# Patient Record
Sex: FEMALE | Race: WHITE | Employment: UNEMPLOYED | URBAN - METROPOLITAN AREA
[De-identification: names, ages, dates, MRNs, and addresses within clinical notes are randomized per-mention and may not be internally consistent; named-entity substitution may affect disease eponyms.]

---

## 2018-10-01 ENCOUNTER — HOSPITAL ENCOUNTER (EMERGENCY)
Age: 83
Discharge: HOME | End: 2018-10-01
Attending: EMERGENCY MEDICINE
Payer: MEDICARE

## 2018-10-01 ENCOUNTER — APPOINTMENT (OUTPATIENT)
Dept: CT IMAGING | Age: 83
End: 2018-10-01
Payer: MEDICARE

## 2018-10-01 VITALS
WEIGHT: 140 LBS | HEIGHT: 66 IN | SYSTOLIC BLOOD PRESSURE: 121 MMHG | BODY MASS INDEX: 22.5 KG/M2 | OXYGEN SATURATION: 98 % | RESPIRATION RATE: 16 BRPM | DIASTOLIC BLOOD PRESSURE: 54 MMHG | HEART RATE: 62 BPM

## 2018-10-01 VITALS
HEART RATE: 76 BPM | OXYGEN SATURATION: 97 % | HEIGHT: 66 IN | SYSTOLIC BLOOD PRESSURE: 124 MMHG | BODY MASS INDEX: 22.5 KG/M2 | WEIGHT: 140 LBS | RESPIRATION RATE: 16 BRPM | TEMPERATURE: 98.8 F | DIASTOLIC BLOOD PRESSURE: 99 MMHG

## 2018-10-01 DIAGNOSIS — S01.01XA LACERATION OF SCALP, INITIAL ENCOUNTER: Primary | ICD-10-CM
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
## 2018-10-01 DIAGNOSIS — S09.90XA CLOSED HEAD INJURY, INITIAL ENCOUNTER: Primary | ICD-10-CM

## 2018-10-01 DIAGNOSIS — S01.01XA LACERATION OF SCALP, INITIAL ENCOUNTER: ICD-10-CM

## 2018-10-01 DIAGNOSIS — S09.90XA INJURY OF HEAD, INITIAL ENCOUNTER: ICD-10-CM

## 2018-10-01 LAB
A/G RATIO: 1.3 (ref 1.1–2.2)
ALBUMIN SERPL-MCNC: 3.4 G/DL (ref 3.4–5)
ALP BLD-CCNC: 69 U/L (ref 40–129)
ALT SERPL-CCNC: 9 U/L (ref 10–40)
ANION GAP SERPL CALCULATED.3IONS-SCNC: 12 MMOL/L (ref 3–16)
AST SERPL-CCNC: 19 U/L (ref 15–37)
BASOPHILS ABSOLUTE: 0 K/UL (ref 0–0.2)
BASOPHILS RELATIVE PERCENT: 0.4 %
BILIRUB SERPL-MCNC: 0.4 MG/DL (ref 0–1)
BUN BLDV-MCNC: 19 MG/DL (ref 7–20)
CALCIUM SERPL-MCNC: 8.9 MG/DL (ref 8.3–10.6)
CHLORIDE BLD-SCNC: 104 MMOL/L (ref 99–110)
CO2: 25 MMOL/L (ref 21–32)
CREAT SERPL-MCNC: 0.8 MG/DL (ref 0.6–1.2)
EOSINOPHILS ABSOLUTE: 0.1 K/UL (ref 0–0.6)
EOSINOPHILS RELATIVE PERCENT: 1.7 %
GFR AFRICAN AMERICAN: >60
GFR NON-AFRICAN AMERICAN: >60
GLOBULIN: 2.7 G/DL
GLUCOSE BLD-MCNC: 105 MG/DL (ref 70–99)
HCT VFR BLD CALC: 41.3 % (ref 36–48)
HEMOGLOBIN: 13.6 G/DL (ref 12–16)
LYMPHOCYTES ABSOLUTE: 2.2 K/UL (ref 1–5.1)
LYMPHOCYTES RELATIVE PERCENT: 29.5 %
MCH RBC QN AUTO: 31 PG (ref 26–34)
MCHC RBC AUTO-ENTMCNC: 33 G/DL (ref 31–36)
MCV RBC AUTO: 94.2 FL (ref 80–100)
MONOCYTES ABSOLUTE: 0.7 K/UL (ref 0–1.3)
MONOCYTES RELATIVE PERCENT: 9 %
NEUTROPHILS ABSOLUTE: 4.4 K/UL (ref 1.7–7.7)
NEUTROPHILS RELATIVE PERCENT: 59.4 %
PDW BLD-RTO: 13.8 % (ref 12.4–15.4)
PLATELET # BLD: 261 K/UL (ref 135–450)
PMV BLD AUTO: 7.7 FL (ref 5–10.5)
POTASSIUM SERPL-SCNC: 4.3 MMOL/L (ref 3.5–5.1)
RBC # BLD: 4.39 M/UL (ref 4–5.2)
SODIUM BLD-SCNC: 141 MMOL/L (ref 136–145)
TOTAL PROTEIN: 6.1 G/DL (ref 6.4–8.2)
WBC # BLD: 7.4 K/UL (ref 4–11)

## 2018-10-01 PROCEDURE — 70450 CT HEAD/BRAIN W/O DYE: CPT

## 2018-10-01 PROCEDURE — 4500000024 HC ED LEVEL 4 PROCEDURE

## 2018-10-01 PROCEDURE — 4500000025 HC ED LEVEL 5 PROCEDURE

## 2018-10-01 PROCEDURE — 72125 CT NECK SPINE W/O DYE: CPT

## 2018-10-01 PROCEDURE — 85025 COMPLETE CBC W/AUTO DIFF WBC: CPT

## 2018-10-01 PROCEDURE — 80053 COMPREHEN METABOLIC PANEL: CPT

## 2018-10-01 PROCEDURE — 99285 EMERGENCY DEPT VISIT HI MDM: CPT

## 2018-10-01 PROCEDURE — 99284 EMERGENCY DEPT VISIT MOD MDM: CPT

## 2018-10-01 PROCEDURE — 6360000002 HC RX W HCPCS: Performed by: PHYSICIAN ASSISTANT

## 2018-10-01 RX ORDER — ACETAMINOPHEN 500 MG
1000 TABLET ORAL ONCE
Status: DISCONTINUED | OUTPATIENT
Start: 2018-10-01 | End: 2018-10-01 | Stop reason: HOSPADM

## 2018-10-01 RX ORDER — DONEPEZIL HYDROCHLORIDE 10 MG/1
10 TABLET, FILM COATED ORAL NIGHTLY
COMMUNITY

## 2018-10-01 RX ORDER — LANOLIN ALCOHOL/MO/W.PET/CERES
3 CREAM (GRAM) TOPICAL DAILY
COMMUNITY

## 2018-10-01 RX ORDER — LORAZEPAM 2 MG/ML
1 INJECTION INTRAMUSCULAR ONCE
Status: COMPLETED | OUTPATIENT
Start: 2018-10-01 | End: 2018-10-01

## 2018-10-01 RX ORDER — AMOXICILLIN 250 MG
1 CAPSULE ORAL DAILY
COMMUNITY

## 2018-10-01 RX ADMIN — LORAZEPAM 1 MG: 2 INJECTION, SOLUTION INTRAMUSCULAR; INTRAVENOUS at 10:30

## 2018-10-01 ASSESSMENT — PAIN DESCRIPTION - LOCATION: LOCATION: HEAD

## 2018-10-01 ASSESSMENT — ENCOUNTER SYMPTOMS
VOMITING: 0
ABDOMINAL PAIN: 0
SHORTNESS OF BREATH: 0
BACK PAIN: 0
NAUSEA: 0

## 2018-10-01 ASSESSMENT — PAIN SCALES - GENERAL: PAINLEVEL_OUTOF10: 10

## 2018-10-01 NOTE — ED PROVIDER NOTES
Attending Supervisory Note/Shared Visit   I have personally performed a face to face diagnostic evaluation on this patient. I have reviewed the mid-levels findings and agree. History and Exam by me shows: This is a 80-year-old female brought to emergency department for evaluation status post fall. Patient does have a history of dementia. Reportedly was walking the hallway and mechanical slip and fall striking the back of her head. No reported loss of consciousness. No reported head injury in the past 2 weeks. Patient himself has no complaints at this time. Physical exam upon arrival patient's afebrile vital signs noted above in general well-appearing well-nourished elderly female in no acute distress chest regular rhythm no anterior chest wall tenderness abdomen soft nontender nondistended lungs clear to auscultation bilaterally no tenderness to palpation over bony prominences of clavicle shoulders humerus elbow is radius ulnar or scaphoid bilaterally. Patient's pelvis is stable no tenderness over femurs knees tib-fib or ankle. Patient does have a 5 cm laceration on the hospital for head no arterial bleeding noted no evidence of depressed skull fracture palpated    Medical decision making  80-year-old female brought to emergency department for evaluation of fall with resultant scalp laceration. Based on patient's history and physical would be concerned about possible intracranial injury. No other obvious injuries are noted on examination patient himself has no current complaints at this time    Clinical impression 1 closed head injury 2.   Scalp laceration     Joseph Bass MD  10/01/18 1111
in conjunction with attending physician Dr. Nilay Galvez. The patient and / or the family were informed of the results of any tests, a time was given to answer questions, a plan was proposed and they agreed with plan. CLINICAL IMPRESSION:  1. Laceration of scalp, initial encounter    2. Injury of head, initial encounter    3.  Fall, initial encounter        DISPOSITION Discharge - Pending Orders Complete 10/01/2018 11:05:07 AM      PATIENT REFERRED TO:  Robinson Altamirano III, MD  [address] [place] [address]  [phone]    Schedule an appointment as soon as possible for a visit   For suture removal 5 days    [facility] Emergency Department  [address] [place] [address]  [phone]    As needed      DISCHARGE MEDICATIONS:  New Prescriptions    No medications on file       DISCONTINUED MEDICATIONS:  Discontinued Medications    No medications on file              (Please note the MDM and HPI sections of this note were completed with a voice recognition program.  Efforts were made to edit the dictations but occasionally words are mis-transcribed.)    Electronically signed, Bridger Trotter PA-C,          Bridger rTotter PA-C  10/01/18 1130

## 2018-10-01 NOTE — ED PROVIDER NOTES
CHIEF COMPLAINT  Fall (pt fell and hit her head. now has small laceration to the back of head. pt just here earlier today. )      HISTORY OF PRESENT ILLNESS  Brenda Galarza is a 80 y.o. female presents to the ED with presents emergency Department chief complaint of the fall. The patient is alert to person only. She is apparently at her baseline per nursing report. The entire HPI as obtained from nursing report and EMS report. As well as the medical record. The patient was recently seen with similar complaints. She had a fall that was seen on security tape apparently tripped  Apparently the patient was back at the nursing facility again and seemed with a another fall today. .  She complains of a minor headache. States is in the top of her head. Is unsure if she lost consciousness he is unsure of any of her medications. .  No other complaints, modifying factors or associated symptoms. I have reviewed the following from the nursing documentation. Past Medical History:   Diagnosis Date    Bronchial asthma     GERD (gastroesophageal reflux disease)     Osteopenia      Past Surgical History:   Procedure Laterality Date    CARPAL TUNNEL RELEASE      Left    CATARACT REMOVAL      Bilateral    COLONOSCOPY  2002    RETINAL DETACHMENT SURGERY      Left    TOTAL HIP ARTHROPLASTY  5/2008    Left    TOTAL KNEE ARTHROPLASTY      Left 9/2007, Right 5/2007    UPPER GASTROINTESTINAL ENDOSCOPY  1990     Family History   Problem Relation Age of Onset    Bleeding Prob Mother         cerebral hemorrhage    Mental Illness Father      Social History     Social History    Marital status:      Spouse name: N/A    Number of children: N/A    Years of education: N/A     Occupational History    Not on file.      Social History Main Topics    Smoking status: Never Smoker    Smokeless tobacco: Never Used    Alcohol use Yes      Comment: occasionally    Drug use: No    Sexual activity: Not on file     Other organomegaly. Non-tender  EXTREMITIES: No peripheral edema. Moves all extremities equally. No gross deformities of the upper or lower extremities. SKIN: Warm and dry. No acute rashes. NEUROLOGICAL: Alert and oriented. Cranial nerves II-12 are  intact, no focal neurologic deficits . PSYCHIATRIC: Normal mood and affect. Physical Exam    LABS  I have reviewed all labs for this visit. No results found for this visit on 10/01/18. EKG  [unfilled]    RADIOLOGY  Ct Head Wo Contrast    Result Date: 10/1/2018  EXAMINATION: CT OF THE HEAD WITHOUT CONTRAST  10/1/2018 4:20 pm TECHNIQUE: CT of the head was performed without the administration of intravenous contrast. Dose modulation, iterative reconstruction, and/or weight based adjustment of the mA/kV was utilized to reduce the radiation dose to as low as reasonably achievable. COMPARISON: 10/01/2018 HISTORY: ORDERING SYSTEM PROVIDED HISTORY: fall TECHNOLOGIST PROVIDED HISTORY: Has a \"code stroke\" or \"stroke alert\" been called? ->No Ordering Physician Provided Reason for Exam: fall Acuity: Acute Type of Exam: Initial Headache. FINDINGS: BRAIN/VENTRICLES: There is no acute intracranial hemorrhage, mass effect or midline shift. No abnormal extra-axial fluid collection. The gray-white differentiation is maintained without evidence of an acute infarct. There is prominence of the ventricles and sulci due to global parenchymal volume loss. There are nonspecific areas of hypoattenuation within the periventricular and subcortical white matter, which likely represent chronic microvascular ischemic change. ORBITS: The visualized portion of the orbits demonstrate no acute abnormality. SINUSES: The visualized paranasal sinuses and mastoid air cells demonstrate no acute abnormality. SOFT TISSUES/SKULL: No acute abnormality of the visualized skull or soft tissues. No acute intracranial abnormality.      Ct Head Wo Contrast    Result Date: 10/1/2018  EXAMINATION: CT OF THE HEAD WITHOUT CONTRAST  10/1/2018 9:32 am TECHNIQUE: CT of the head was performed without the administration of intravenous contrast. Dose modulation, iterative reconstruction, and/or weight based adjustment of the mA/kV was utilized to reduce the radiation dose to as low as reasonably achievable. COMPARISON: None. HISTORY: ORDERING SYSTEM PROVIDED HISTORY: head injury TECHNOLOGIST PROVIDED HISTORY: If patient is on cardiac monitor and/or pulse ox, they may be taken off cardiac monitor and pulse ox, left on O2 if currently on. All monitors reattached when patient returns to room. Has a \"code stroke\" or \"stroke alert\" been called? ->No Ordering Physician Provided Reason for Exam: fall Acuity: Acute Type of Exam: Initial Headache. FINDINGS: BRAIN/VENTRICLES: There is no acute intracranial hemorrhage, mass effect or midline shift. No abnormal extra-axial fluid collection. The gray-white differentiation is maintained without evidence of an acute infarct. There is prominence of the ventricles and sulci due to global parenchymal volume loss. There are nonspecific areas of hypoattenuation within the periventricular and subcortical white matter, which likely represent chronic microvascular ischemic change. ORBITS: The visualized portion of the orbits demonstrate no acute abnormality. SINUSES: The visualized paranasal sinuses and mastoid air cells demonstrate no acute abnormality. SOFT TISSUES/SKULL: No acute abnormality of the visualized skull or soft tissues. No acute intracranial abnormality. Ct Cervical Spine Wo Contrast    Result Date: 10/1/2018  EXAMINATION: CT OF THE CERVICAL SPINE WITHOUT CONTRAST 10/1/2018 9:32 am TECHNIQUE: CT of the cervical spine was performed without the administration of intravenous contrast. Multiplanar reformatted images are provided for review.  Dose modulation, iterative reconstruction, and/or weight based adjustment of the mA/kV was utilized to reduce the radiation dose to as low as

## 2018-10-01 NOTE — ED NOTES
Pt ambulated well with x1 assist. Pt denies any pain or difficulty with ambulation.       Valentino Stark, RN  10/01/18 6664

## 2018-10-01 NOTE — ED NOTES
Bed: 14  Expected date:   Expected time:   Means of arrival: [facility]  Comments:  Taya Rome RN  10/01/18 1202

## 2020-08-01 ENCOUNTER — APPOINTMENT (OUTPATIENT)
Dept: CT IMAGING | Age: 85
DRG: 533 | End: 2020-08-01
Payer: MEDICARE

## 2020-08-01 ENCOUNTER — HOSPITAL ENCOUNTER (INPATIENT)
Age: 85
LOS: 2 days | Discharge: OTHER INSTITUTION | DRG: 533 | End: 2020-08-03
Attending: EMERGENCY MEDICINE | Admitting: INTERNAL MEDICINE
Payer: MEDICARE

## 2020-08-01 ENCOUNTER — APPOINTMENT (OUTPATIENT)
Dept: GENERAL RADIOLOGY | Age: 85
DRG: 533 | End: 2020-08-01
Payer: MEDICARE

## 2020-08-01 PROBLEM — T14.8XXA CLOSED FRACTURE OF BONE: Status: ACTIVE | Noted: 2020-08-01

## 2020-08-01 LAB
A/G RATIO: 1.2 (ref 1.1–2.2)
ABO/RH: NORMAL
ALBUMIN SERPL-MCNC: 3.5 G/DL (ref 3.4–5)
ALP BLD-CCNC: 82 U/L (ref 40–129)
ALT SERPL-CCNC: 21 U/L (ref 10–40)
ANION GAP SERPL CALCULATED.3IONS-SCNC: 10 MMOL/L (ref 3–16)
ANTIBODY SCREEN: NORMAL
AST SERPL-CCNC: 34 U/L (ref 15–37)
BACTERIA: ABNORMAL /HPF
BASOPHILS ABSOLUTE: 0 K/UL (ref 0–0.2)
BASOPHILS RELATIVE PERCENT: 0.3 %
BILIRUB SERPL-MCNC: 0.3 MG/DL (ref 0–1)
BILIRUBIN URINE: NEGATIVE
BLOOD, URINE: ABNORMAL
BUN BLDV-MCNC: 25 MG/DL (ref 7–20)
CALCIUM SERPL-MCNC: 9.3 MG/DL (ref 8.3–10.6)
CHLORIDE BLD-SCNC: 108 MMOL/L (ref 99–110)
CLARITY: ABNORMAL
CO2: 26 MMOL/L (ref 21–32)
COLOR: YELLOW
COMMENT UA: ABNORMAL
CREAT SERPL-MCNC: 0.7 MG/DL (ref 0.6–1.2)
DAT IGG CAPTURE: NORMAL
EOSINOPHILS ABSOLUTE: 0 K/UL (ref 0–0.6)
EOSINOPHILS RELATIVE PERCENT: 0 %
EPITHELIAL CELLS, UA: 3 /HPF (ref 0–5)
GFR AFRICAN AMERICAN: >60
GFR NON-AFRICAN AMERICAN: >60
GLOBULIN: 3 G/DL
GLUCOSE BLD-MCNC: 132 MG/DL (ref 70–99)
GLUCOSE URINE: NEGATIVE MG/DL
HCT VFR BLD CALC: 26.9 % (ref 36–48)
HEMOGLOBIN: 8.3 G/DL (ref 12–16)
HYALINE CASTS: 11 /LPF (ref 0–8)
INR BLD: 1.53 (ref 0.86–1.14)
KETONES, URINE: ABNORMAL MG/DL
LACTIC ACID: 1.4 MMOL/L (ref 0.4–2)
LEUKOCYTE ESTERASE, URINE: ABNORMAL
LYMPHOCYTES ABSOLUTE: 1.3 K/UL (ref 1–5.1)
LYMPHOCYTES RELATIVE PERCENT: 12.4 %
MCH RBC QN AUTO: 23.8 PG (ref 26–34)
MCHC RBC AUTO-ENTMCNC: 30.7 G/DL (ref 31–36)
MCV RBC AUTO: 77.5 FL (ref 80–100)
MICROSCOPIC EXAMINATION: YES
MONOCYTES ABSOLUTE: 1.1 K/UL (ref 0–1.3)
MONOCYTES RELATIVE PERCENT: 10.5 %
NEUTROPHILS ABSOLUTE: 8.2 K/UL (ref 1.7–7.7)
NEUTROPHILS RELATIVE PERCENT: 76.8 %
NITRITE, URINE: NEGATIVE
PDW BLD-RTO: 18.6 % (ref 12.4–15.4)
PH UA: 5.5 (ref 5–8)
PLATELET # BLD: 403 K/UL (ref 135–450)
PMV BLD AUTO: 7.4 FL (ref 5–10.5)
POTASSIUM REFLEX MAGNESIUM: 4.2 MMOL/L (ref 3.5–5.1)
PRO-BNP: 727 PG/ML (ref 0–449)
PROTEIN UA: ABNORMAL MG/DL
PROTHROMBIN TIME: 17.8 SEC (ref 10–13.2)
RBC # BLD: 3.47 M/UL (ref 4–5.2)
RBC UA: 1 /HPF (ref 0–4)
SODIUM BLD-SCNC: 144 MMOL/L (ref 136–145)
SPECIFIC GRAVITY UA: 1.02 (ref 1–1.03)
TOTAL PROTEIN: 6.5 G/DL (ref 6.4–8.2)
TROPONIN: 0.04 NG/ML
URINE REFLEX TO CULTURE: ABNORMAL
URINE TYPE: ABNORMAL
UROBILINOGEN, URINE: 0.2 E.U./DL
WBC # BLD: 10.7 K/UL (ref 4–11)
WBC UA: 4 /HPF (ref 0–5)

## 2020-08-01 PROCEDURE — 84484 ASSAY OF TROPONIN QUANT: CPT

## 2020-08-01 PROCEDURE — 2580000003 HC RX 258: Performed by: EMERGENCY MEDICINE

## 2020-08-01 PROCEDURE — 6360000002 HC RX W HCPCS: Performed by: PHYSICIAN ASSISTANT

## 2020-08-01 PROCEDURE — 86870 RBC ANTIBODY IDENTIFICATION: CPT

## 2020-08-01 PROCEDURE — 93005 ELECTROCARDIOGRAM TRACING: CPT | Performed by: PHYSICIAN ASSISTANT

## 2020-08-01 PROCEDURE — 83605 ASSAY OF LACTIC ACID: CPT

## 2020-08-01 PROCEDURE — 6370000000 HC RX 637 (ALT 250 FOR IP): Performed by: EMERGENCY MEDICINE

## 2020-08-01 PROCEDURE — 86880 COOMBS TEST DIRECT: CPT

## 2020-08-01 PROCEDURE — 86905 BLOOD TYPING RBC ANTIGENS: CPT

## 2020-08-01 PROCEDURE — 99285 EMERGENCY DEPT VISIT HI MDM: CPT

## 2020-08-01 PROCEDURE — U0002 COVID-19 LAB TEST NON-CDC: HCPCS

## 2020-08-01 PROCEDURE — 83880 ASSAY OF NATRIURETIC PEPTIDE: CPT

## 2020-08-01 PROCEDURE — 87040 BLOOD CULTURE FOR BACTERIA: CPT

## 2020-08-01 PROCEDURE — 71045 X-RAY EXAM CHEST 1 VIEW: CPT

## 2020-08-01 PROCEDURE — 73552 X-RAY EXAM OF FEMUR 2/>: CPT

## 2020-08-01 PROCEDURE — 86850 RBC ANTIBODY SCREEN: CPT

## 2020-08-01 PROCEDURE — 86860 RBC ANTIBODY ELUTION: CPT

## 2020-08-01 PROCEDURE — 80053 COMPREHEN METABOLIC PANEL: CPT

## 2020-08-01 PROCEDURE — 86901 BLOOD TYPING SEROLOGIC RH(D): CPT

## 2020-08-01 PROCEDURE — 6360000002 HC RX W HCPCS: Performed by: EMERGENCY MEDICINE

## 2020-08-01 PROCEDURE — U0003 INFECTIOUS AGENT DETECTION BY NUCLEIC ACID (DNA OR RNA); SEVERE ACUTE RESPIRATORY SYNDROME CORONAVIRUS 2 (SARS-COV-2) (CORONAVIRUS DISEASE [COVID-19]), AMPLIFIED PROBE TECHNIQUE, MAKING USE OF HIGH THROUGHPUT TECHNOLOGIES AS DESCRIBED BY CMS-2020-01-R: HCPCS

## 2020-08-01 PROCEDURE — 1200000000 HC SEMI PRIVATE

## 2020-08-01 PROCEDURE — 70450 CT HEAD/BRAIN W/O DYE: CPT

## 2020-08-01 PROCEDURE — 86900 BLOOD TYPING SEROLOGIC ABO: CPT

## 2020-08-01 PROCEDURE — 81001 URINALYSIS AUTO W/SCOPE: CPT

## 2020-08-01 PROCEDURE — 85025 COMPLETE CBC W/AUTO DIFF WBC: CPT

## 2020-08-01 PROCEDURE — 72125 CT NECK SPINE W/O DYE: CPT

## 2020-08-01 PROCEDURE — 85610 PROTHROMBIN TIME: CPT

## 2020-08-01 RX ORDER — MORPHINE SULFATE 2 MG/ML
2 INJECTION, SOLUTION INTRAMUSCULAR; INTRAVENOUS ONCE
Status: COMPLETED | OUTPATIENT
Start: 2020-08-01 | End: 2020-08-01

## 2020-08-01 RX ORDER — ONDANSETRON 2 MG/ML
4 INJECTION INTRAMUSCULAR; INTRAVENOUS ONCE
Status: COMPLETED | OUTPATIENT
Start: 2020-08-01 | End: 2020-08-01

## 2020-08-01 RX ORDER — ASPIRIN 81 MG/1
324 TABLET, CHEWABLE ORAL ONCE
Status: DISCONTINUED | OUTPATIENT
Start: 2020-08-01 | End: 2020-08-02 | Stop reason: HOSPADM

## 2020-08-01 RX ORDER — MORPHINE SULFATE 2 MG/ML
2 INJECTION, SOLUTION INTRAMUSCULAR; INTRAVENOUS
Status: DISCONTINUED | OUTPATIENT
Start: 2020-08-01 | End: 2020-08-02 | Stop reason: HOSPADM

## 2020-08-01 RX ORDER — ACETAMINOPHEN 325 MG/1
650 TABLET ORAL EVERY 6 HOURS PRN
COMMUNITY

## 2020-08-01 RX ORDER — LORAZEPAM 2 MG/ML
1 INJECTION INTRAMUSCULAR ONCE
Status: COMPLETED | OUTPATIENT
Start: 2020-08-01 | End: 2020-08-01

## 2020-08-01 RX ORDER — ACETAMINOPHEN 650 MG/1
650 SUPPOSITORY RECTAL ONCE
Status: COMPLETED | OUTPATIENT
Start: 2020-08-01 | End: 2020-08-01

## 2020-08-01 RX ORDER — LORAZEPAM 2 MG/ML
1 INJECTION INTRAMUSCULAR ONCE
Status: DISCONTINUED | OUTPATIENT
Start: 2020-08-01 | End: 2020-08-02 | Stop reason: HOSPADM

## 2020-08-01 RX ORDER — 0.9 % SODIUM CHLORIDE 0.9 %
30 INTRAVENOUS SOLUTION INTRAVENOUS ONCE
Status: COMPLETED | OUTPATIENT
Start: 2020-08-01 | End: 2020-08-01

## 2020-08-01 RX ORDER — M-VIT,TX,IRON,MINS/CALC/FOLIC 27MG-0.4MG
1 TABLET ORAL DAILY
COMMUNITY

## 2020-08-01 RX ADMIN — LORAZEPAM 1 MG: 2 INJECTION INTRAMUSCULAR; INTRAVENOUS at 19:27

## 2020-08-01 RX ADMIN — SODIUM CHLORIDE 1905 ML: 9 INJECTION, SOLUTION INTRAVENOUS at 19:57

## 2020-08-01 RX ADMIN — ACETAMINOPHEN 650 MG: 650 SUPPOSITORY RECTAL at 20:06

## 2020-08-01 RX ADMIN — MORPHINE SULFATE 2 MG: 2 INJECTION, SOLUTION INTRAMUSCULAR; INTRAVENOUS at 18:58

## 2020-08-01 RX ADMIN — ONDANSETRON 4 MG: 2 INJECTION INTRAMUSCULAR; INTRAVENOUS at 18:58

## 2020-08-01 ASSESSMENT — PAIN SCALES - GENERAL: PAINLEVEL_OUTOF10: 0

## 2020-08-01 NOTE — ED NOTES
Bed: 30  Expected date:   Expected time:   Means of arrival: [facility]  Comments:  [address]; [facility], [facility]  08/01/20 9511

## 2020-08-01 NOTE — ED PROVIDER NOTES
I independently performed a history and physical on Debby Conley. All diagnostic, treatment, and disposition decisions were made by myself in conjunction with the advanced practice provider. I have participated in the medical decision making and directed the treatment plan and disposition of the patient. For further details of [address] [facility] emergency department encounter, please see the advanced practice provider's documentation. CHIEF COMPLAINT  Chief Complaint   Patient presents with    Fall     [facility] due to a fall that happened on thursday. Pt got xrays at facility and found right distal femur fracture. Briefly, Kristyn Kirkpatrick is a 80 y.o. female  who presents to the ED complaining of fall apparently on Thursday. History unavailable from patient directly due to her dementia. Reportedly she has a R femur fracture distally but don't have imaging to verify this from [employer]. Found to be febrile in triage. FOCUSED PHYSICAL EXAMINATION  BP (!) 115/54   Pulse 113   Temp 100.5 °F (38.1 °C) (Oral)   Resp 22   Ht 5' 6\" (1.676 m)   Wt 140 lb (63.5 kg)   SpO2 97%   BMI 22.60 kg/m²    Focused physical examination notable for chronically ill appearing, demented, RLE is tender to palpation with thigh swelling noted. Gait not assessed. No ttp apparent in other extremities. NC/AT. No apparent Cspine ttp.     The 12 lead EKG was interpreted by me as follows:  Rate: tachycardia with a rate of 104  Rhythm: sinus  Axis: normal  Intervals: normal MN, narrow QRS, normal QTc  ST segments: no ST elevations or depressions  T waves: no abnormal inversions  Non-specific T wave changes: not present  Prior EKG comparison: EKG dated 2/5/10 is not significantly different    MDM:  ED course was notable for reported R femur fracture and incidentally found to have fever in triage though this was not reported to us before arrival.  Her dementia is severe and she is not able to provide much helpful history herself. DNR noted on the chart. COVID-19 swab obtained considering her fever and lack of available history though her main reason for presentation is injury to the RLE. Septic workup initiated. Findings notable for hemoglobin of 8.3. From trauma perspective, R femur XR shows periprosthetic fracture, orthopedics was consulted, and CT head/cspine show no acute findings from a trauma standpoint. CXR clear. Cultures and COVID swab are pending. Troponin mildly elevated as well. Although she has at one point hypertension with tachycardia and a known fracture, options for treatment are a little limited given her confirmed DNR comfort care only status with paperwork on the chart. As such, transfusions and central lines and pressors are deferred in lieu of IV fluid resuscitation for now. Hemodynamics will require some close monitoring though. During the patient's ED course, the patient was given:  Medications   LORazepam (ATIVAN) injection 1 mg (0 mg Intravenous Held 8/1/20 2052)   morphine (PF) injection 2 mg (has no administration in time range)   aspirin chewable tablet 324 mg (has no administration in time range)   morphine (PF) injection 2 mg (2 mg Intravenous Given 8/1/20 1858)   ondansetron (ZOFRAN) injection 4 mg (4 mg Intravenous Given 8/1/20 1858)   acetaminophen (TYLENOL) suppository 650 mg (650 mg Rectal Given 8/1/20 2006)   LORazepam (ATIVAN) injection 1 mg (1 mg Intravenous Given 8/1/20 1927)   0.9 % sodium chloride IV bolus 1,905 mL (0 mL/kg × 63.5 kg Intravenous Stopped 8/1/20 2133)        CLINICAL IMPRESSION  1. Closed fracture periprosthetic of distal end of right femur, unspecified fracture morphology, initial encounter ([facility]    2. Dementia with behavioral disturbance, unspecified dementia type (HCC)    3. Elevated troponin    4. Normal pressure hydrocephalus ([address]        DISPOSITION  Debby Conley was admitted in fair condition.     The plan is to admit to the hospital at this time under the PCP's admitting service. Dr. Mercedes Vega accepted the patient and will take over the patient's care. The total critical care time spent while evaluating and treating this patient was at least 50 minutes. This excludes time spent doing separately billable procedures. This includes time at the bedside, data interpretation, medication management, obtaining critical history from collateral sources if the patient is unable to provide it directly, and physician consultation. Specifics of interventions taken and potentially life-threatening diagnostic considerations are listed above in the medical decision making. This chart was created using Dragon dictation software. Efforts were made by me to ensure accuracy, however some errors may be present due to limitations of this technology.             Luis Carlos Wolfe MD  08/01/20 6518

## 2020-08-01 NOTE — ED PROVIDER NOTES
[facility]        Pt Name: Sukumar Madrigal  MRN: [id number]  [place] 11/25/1925  Date of evaluation: 8/1/2020  Provider: Adry Bernabe PA-C  PCP: Frandy Moss MD     I have seen and evaluated this patient with my supervising physician Jaylene Morales MD.    [address] [place] [address]       Chief Complaint   Patient presents with    Fall     [facility] due to a fall that happened on thursday. Pt got xrays at facility and found right distal femur fracture. HISTORY OF PRESENT ILLNESS   (Location, Timing/Onset, Context/Setting, Quality, Duration, Modifying Factors, Severity, Associated Signs and Symptoms)  Note limiting factors. Sukumar Madrigal is a 80 y.o. female presents to the emergency department via [facility] emergency medical services from the [facility] side of [address] [place] [address] with a history of significant dementia. It is reported that the patient had an injury from a fall that occurred on Thursday. The exact events leading up to the above-mentioned are not immediately noted. It is reported that she did receive x-rays in the facility and was found to have a right distal periprosthetic femur fracture. It is reported that she has been sent to the emergency department for definitive care and treatment. It is noted the patient is not normally ambulatory. It appears that she is normally wheelchair dependent. Unfortunately because of advanced dementia the patient cannot assist me with much of any of her history and this is all that is available other than her documented medical reconciliation and history form. Nursing Notes were all reviewed and agreed with or any disagreements were addressed in the HPI.     REVIEW OF SYSTEMS    (2-9 systems for level 4, 10 or more for level 5)     Review of Systems   Unable to perform ROS: Dementia       Positives and Pertinent negatives as per HPI.  Except as noted above in the ROS, all other systems were reviewed and negative. PAST MEDICAL HISTORY     Past Medical History:   Diagnosis Date    AD [facility]Al[facility]'s [facility]     Anemia     Arthritis     Bronchial asthma     Dementia ([facility]     GERD (gastroesophageal reflux disease)     Hyperlipidemia     Osteopenia     Overactive bladder          SURGICAL HISTORY     Past Surgical History:   Procedure Laterality Date    CARPAL TUNNEL RELEASE      Left    CATARACT REMOVAL      Bilateral    COLONOSCOPY  2002    JOINT REPLACEMENT      RETINAL DETACHMENT SURGERY      Left    TOTAL HIP ARTHROPLASTY  5/2008    Left    TOTAL KNEE ARTHROPLASTY      Left 9/2007, Right 5/2007    UPPER GASTROINTESTINAL ENDOSCOPY  1990         CURRENTMEDICATIONS       Previous Medications    ACETAMINOPHEN (TYLENOL) 325 MG TABLET    Take 650 mg by mouth every 6 hours as needed for Pain    ALBUTEROL (PROAIR HFA) 108 (90 BASE) MCG/ACT INHALER    Inhale 2 puffs into the lungs every 4 hours as needed. APIXABAN (ELIQUIS) 2.5 MG TABS TABLET    Take by mouth 2 times daily    DONEPEZIL (ARICEPT) 10 MG TABLET    Take 10 mg by mouth nightly    GABAPENTIN (NEURONTIN) 100 MG TABLET    Take 1-4 tablets by mouth nightly for 360 days. MELATONIN 3 MG TABS TABLET    Take 3 mg by mouth daily    METOPROLOL (LOPRESSOR) 25 MG TABLET    Take 25 mg by mouth 2 times daily. 1/2 tab by mouth bid    MONTELUKAST (SINGULAIR) 10 MG TABLET    Take 10 mg by mouth nightly. MULTIPLE VITAMINS-MINERALS (THERAPEUTIC MULTIVITAMIN-MINERALS) TABLET    Take 1 tablet by mouth daily    POLYETHYLENE GLYCOL (MIRALAX) POWDER    Take 17 g by mouth daily. 1 capful in 8 oz water daily as needed     RALOXIFENE (EVISTA) 60 MG TABLET    Take 1 tablet by mouth daily.     SENNA-DOCUSATE (PERICOLACE) 8.6-50 MG PER TABLET    Take 1 tablet by mouth daily    VITAMIN D (CHOLECALCIFEROL) 1000 UNIT TABS TABLET    Take 1,000 Units by mouth daily         ALLERGIES Alendronate sodium and Prilosec [omeprazole]    FAMILYHISTORY       Family History   Problem Relation Age of Onset    Bleeding Prob Mother         cerebral hemorrhage    Mental Illness Father           SOCIAL HISTORY       Social History     Tobacco Use    Smoking status: Never Smoker    Smokeless tobacco: Never Used   Substance Use Topics    Alcohol use: Yes     Comment: occasionally    Drug use: No       SCREENINGS             PHYSICAL EXAM    (up to 7 for level 4, 8 or more for level 5)     ED Triage Vitals   BP Temp Temp src Pulse Resp SpO2 Height Weight   -- -- -- -- -- -- -- --       Physical Exam  Vitals signs and nursing note reviewed. Constitutional:       General: She is awake. She is not in acute distress. Appearance: Normal appearance. She is well-developed. She is ill-appearing (chronically). She is not diaphoretic. Comments: Mostly cooperative. Difficult time staying on task for commands. HENT:      Head: Normocephalic and atraumatic. No raccoon eyes, Keith's sign, contusion or laceration. Right Ear: Hearing and external ear normal.      Left Ear: Hearing and external ear normal.   Eyes:      General: No scleral icterus. Right eye: No discharge. Left eye: No discharge. Conjunctiva/sclera: Conjunctivae normal.   Neck:      Musculoskeletal: Normal range of motion. Normal range of motion. No injury, spinous process tenderness or muscular tenderness. Vascular: No JVD. Cardiovascular:      Rate and Rhythm: Normal rate and regular rhythm. Heart sounds: No murmur. No friction rub. No gallop. Pulmonary:      Effort: Pulmonary effort is normal. No accessory muscle usage or respiratory distress. Breath sounds: Normal breath sounds. No wheezing, rhonchi or rales. Abdominal:      General: There is no distension. Palpations: Abdomen is soft. Abdomen is not rigid. There is no mass. Tenderness: There is no abdominal tenderness.  There is Laboratory  [address]   Phone [phone]   BRAIN NATRIURETIC PEPTIDE - Abnormal; Notable for the following components:    Pro- (*)     All other components within normal limits    Narrative:     Performed at:  [facility] [address], [place], [address]   Phone [phone]   URINE RT REFLEX TO CULTURE - Abnormal; Notable for the following components:    Clarity, UA CLOUDY (*)     Ketones, Urine TRACE (*)     Blood, Urine TRACE (*)     Protein, UA TRACE (*)     Leukocyte Esterase, Urine TRACE (*)     All other components within normal limits    Narrative:     Performed at:  [facility] [address], [place], [address] [facility]   Phone [phone]   PROTIME-INR - Abnormal; Notable for the following components:    Protime 17.8 (*)     INR 1.53 (*)     All other components within normal limits    Narrative:     Performed at:  [facility] [address], [place], [address]   Phone [phone]   MICROSCOPIC URINALYSIS - Abnormal; Notable for the following components:    Bacteria, UA 4+ (*)     Hyaline Casts, UA 11 (*)     All other components within normal limits    Narrative:     Performed at:  [facility] [address], [place], [address] [facility]   Phone [phone]   CULTURE, BLOOD 1   CULTURE, BLOOD 2   LACTIC ACID, PLASMA    Narrative:     Performed at:  [facility] [address], [place], [address] [facility]   Phone [phone]   BLOOD GAS, VENOUS   COVID-19   TYPE AND SCREEN   CHETNA IGG TUBE       All other labs were within normal range or not returned as of this dictation. EKG: All EKG's are interpreted by the Emergency Department Physician in the absence of a cardiologist.  Please see their note for interpretation of EKG.       RADIOLOGY:   Non-plain film images such as CT, Ultrasound and MRI are read by acetaminophen (TYLENOL) suppository 650 mg (650 mg Rectal Given 8/1/20 2006)   LORazepam (ATIVAN) injection 1 mg (1 mg Intravenous Given 8/1/20 1927)   0.9 % sodium chloride IV bolus 1,905 mL (1,905 mLs Intravenous New Bag 8/1/20 1957)           The patient's detailed history of present illness is documented as above. Upon arrival to the emergency department the patient's vital signs are as documented. The patient is noted to be hemodynamically stable and afebrile. Physical examination findings are as above. Patient has had variable levels of uncooperativeness here in the emerge department setting. IV access was obtained. Laboratory testing and work-up was initiated she was medicated as above. EKG performed on arrival demonstrates a sinus tachycardia with a rate of 104. No evidence of acute ST elevation. Please see attending physician details for further EKG interpretation. CBC demonstrates no evidence of leukocytosis. There is anemia with a hemoglobin of 8.3 hematocrit of 26.9 with no evidence of thrombocytopenia or thrombocytosis. BUN is 25 creatinine 0.7 nonfasting glucose 132 electrolytes LFTs unremarkable. Troponin is elevated at 0.04. Patient was given aspirin when this was noted. proBNP is 727. UA initially not concerning for infection with leukocytes but there are only 4 white blood cells. This will be cultured and will not be treated at present. INR is 1.53 and lactic acid is not elevated at 1.4. CT of the head demonstrates moderate cerebral cortical atrophy. The ventricles are somewhat enlarged out of proportion to the atrophy noted and concerns for the possibility of normal pressure hydrocephalus is noted. No evidence of acute fracture and or infarct. Large confluent areas of small vessel infarct from previous ischemic events are as documented. No significant interval change when compared to 2018.   CT cervical spine demonstrates multilevel degenerative changes with no evidence of

## 2020-08-02 PROBLEM — E43 SEVERE PROTEIN-CALORIE MALNUTRITION (HCC): Status: ACTIVE | Noted: 2020-08-02

## 2020-08-02 PROBLEM — S72.401D CLOSED FRACTURE OF DISTAL END OF RIGHT FEMUR WITH ROUTINE HEALING: Status: ACTIVE | Noted: 2020-08-02

## 2020-08-02 PROBLEM — G91.2 NORMAL PRESSURE HYDROCEPHALUS (HCC): Status: ACTIVE | Noted: 2020-08-02

## 2020-08-02 PROBLEM — D63.8 ANEMIA, CHRONIC DISEASE: Status: ACTIVE | Noted: 2020-08-02

## 2020-08-02 PROBLEM — F01.50 VASCULAR DEMENTIA WITHOUT BEHAVIORAL DISTURBANCE (HCC): Status: ACTIVE | Noted: 2020-08-02

## 2020-08-02 PROBLEM — E86.0 DEHYDRATION: Status: ACTIVE | Noted: 2020-08-02

## 2020-08-02 PROBLEM — T14.8XXA CLOSED FRACTURE OF BONE: Status: RESOLVED | Noted: 2020-08-01 | Resolved: 2020-08-02

## 2020-08-02 LAB
ANTIBODY IDENTIFICATION: NORMAL
BASE EXCESS VENOUS: -1 MMOL/L (ref -3–3)
CARBOXYHEMOGLOBIN: 9.2 % (ref 0–1.5)
EKG ATRIAL RATE: 104 BPM
EKG DIAGNOSIS: NORMAL
EKG P AXIS: 81 DEGREES
EKG P-R INTERVAL: 124 MS
EKG Q-T INTERVAL: 312 MS
EKG QRS DURATION: 70 MS
EKG QTC CALCULATION (BAZETT): 410 MS
EKG R AXIS: 67 DEGREES
EKG T AXIS: 73 DEGREES
EKG VENTRICULAR RATE: 104 BPM
HCO3 VENOUS: 21.2 MMOL/L (ref 23–29)
METHEMOGLOBIN VENOUS: 0 %
O2 CONTENT, VEN: 10 VOL %
O2 SAT, VEN: >100 %
O2 THERAPY: ABNORMAL
PCO2, VEN: 24.9 MMHG (ref 40–50)
PH VENOUS: 7.54 (ref 7.35–7.45)
PO2, VEN: 189 MMHG (ref 25–40)
TCO2 CALC VENOUS: 49 MMOL/L

## 2020-08-02 PROCEDURE — 2580000003 HC RX 258: Performed by: INTERNAL MEDICINE

## 2020-08-02 PROCEDURE — 6360000002 HC RX W HCPCS: Performed by: INTERNAL MEDICINE

## 2020-08-02 PROCEDURE — 93010 ELECTROCARDIOGRAM REPORT: CPT | Performed by: INTERNAL MEDICINE

## 2020-08-02 PROCEDURE — 6370000000 HC RX 637 (ALT 250 FOR IP): Performed by: INTERNAL MEDICINE

## 2020-08-02 PROCEDURE — 82803 BLOOD GASES ANY COMBINATION: CPT

## 2020-08-02 PROCEDURE — 87040 BLOOD CULTURE FOR BACTERIA: CPT

## 2020-08-02 PROCEDURE — 99219 PR INITIAL OBSERVATION CARE/DAY 50 MINUTES: CPT | Performed by: ORTHOPAEDIC SURGERY

## 2020-08-02 PROCEDURE — 1200000000 HC SEMI PRIVATE

## 2020-08-02 RX ORDER — ALBUTEROL SULFATE 90 UG/1
2 AEROSOL, METERED RESPIRATORY (INHALATION) EVERY 4 HOURS PRN
Status: DISCONTINUED | OUTPATIENT
Start: 2020-08-02 | End: 2020-08-02 | Stop reason: ALTCHOICE

## 2020-08-02 RX ORDER — SODIUM CHLORIDE 9 MG/ML
INJECTION, SOLUTION INTRAVENOUS CONTINUOUS
Status: DISCONTINUED | OUTPATIENT
Start: 2020-08-02 | End: 2020-08-02 | Stop reason: SDUPTHER

## 2020-08-02 RX ORDER — LORAZEPAM 2 MG/ML
1 INJECTION INTRAMUSCULAR EVERY 4 HOURS PRN
Status: DISCONTINUED | OUTPATIENT
Start: 2020-08-02 | End: 2020-08-03 | Stop reason: HOSPADM

## 2020-08-02 RX ORDER — ACETAMINOPHEN 325 MG/1
650 TABLET ORAL EVERY 6 HOURS PRN
Status: DISCONTINUED | OUTPATIENT
Start: 2020-08-02 | End: 2020-08-03 | Stop reason: HOSPADM

## 2020-08-02 RX ORDER — LANOLIN ALCOHOL/MO/W.PET/CERES
3 CREAM (GRAM) TOPICAL DAILY
Status: DISCONTINUED | OUTPATIENT
Start: 2020-08-02 | End: 2020-08-02 | Stop reason: ALTCHOICE

## 2020-08-02 RX ORDER — M-VIT,TX,IRON,MINS/CALC/FOLIC 27MG-0.4MG
1 TABLET ORAL DAILY
Status: DISCONTINUED | OUTPATIENT
Start: 2020-08-02 | End: 2020-08-03 | Stop reason: HOSPADM

## 2020-08-02 RX ORDER — POLYETHYLENE GLYCOL 3350 17 G/17G
17 POWDER, FOR SOLUTION ORAL DAILY
Status: DISCONTINUED | OUTPATIENT
Start: 2020-08-02 | End: 2020-08-03 | Stop reason: HOSPADM

## 2020-08-02 RX ORDER — MONTELUKAST SODIUM 10 MG/1
10 TABLET ORAL NIGHTLY
Status: DISCONTINUED | OUTPATIENT
Start: 2020-08-02 | End: 2020-08-02 | Stop reason: ALTCHOICE

## 2020-08-02 RX ORDER — ONDANSETRON 2 MG/ML
4 INJECTION INTRAMUSCULAR; INTRAVENOUS EVERY 6 HOURS PRN
Status: DISCONTINUED | OUTPATIENT
Start: 2020-08-02 | End: 2020-08-03 | Stop reason: HOSPADM

## 2020-08-02 RX ORDER — SENNA AND DOCUSATE SODIUM 50; 8.6 MG/1; MG/1
1 TABLET, FILM COATED ORAL DAILY
Status: DISCONTINUED | OUTPATIENT
Start: 2020-08-02 | End: 2020-08-03 | Stop reason: HOSPADM

## 2020-08-02 RX ORDER — HYDROMORPHONE HYDROCHLORIDE 1 MG/ML
0.5 INJECTION, SOLUTION INTRAMUSCULAR; INTRAVENOUS; SUBCUTANEOUS EVERY 4 HOURS PRN
Status: DISCONTINUED | OUTPATIENT
Start: 2020-08-02 | End: 2020-08-03 | Stop reason: HOSPADM

## 2020-08-02 RX ORDER — DONEPEZIL HYDROCHLORIDE 5 MG/1
10 TABLET, FILM COATED ORAL NIGHTLY
Status: DISCONTINUED | OUTPATIENT
Start: 2020-08-02 | End: 2020-08-02 | Stop reason: ALTCHOICE

## 2020-08-02 RX ORDER — SODIUM CHLORIDE 9 MG/ML
INJECTION, SOLUTION INTRAVENOUS CONTINUOUS
Status: DISCONTINUED | OUTPATIENT
Start: 2020-08-02 | End: 2020-08-03 | Stop reason: HOSPADM

## 2020-08-02 RX ORDER — RALOXIFENE HYDROCHLORIDE 60 MG/1
60 TABLET, FILM COATED ORAL DAILY
Status: DISCONTINUED | OUTPATIENT
Start: 2020-08-02 | End: 2020-08-02 | Stop reason: ALTCHOICE

## 2020-08-02 RX ADMIN — STANDARDIZED SENNA CONCENTRATE AND DOCUSATE SODIUM 1 TABLET: 8.6; 5 TABLET ORAL at 09:34

## 2020-08-02 RX ADMIN — HYDROMORPHONE HYDROCHLORIDE 0.5 MG: 1 INJECTION, SOLUTION INTRAMUSCULAR; INTRAVENOUS; SUBCUTANEOUS at 09:34

## 2020-08-02 RX ADMIN — SODIUM CHLORIDE: 9 INJECTION, SOLUTION INTRAVENOUS at 03:55

## 2020-08-02 RX ADMIN — SODIUM CHLORIDE: 9 INJECTION, SOLUTION INTRAVENOUS at 12:41

## 2020-08-02 RX ADMIN — APIXABAN 2.5 MG: 5 TABLET, FILM COATED ORAL at 20:04

## 2020-08-02 RX ADMIN — POLYETHYLENE GLYCOL 3350 17 G: 17 POWDER, FOR SOLUTION ORAL at 09:33

## 2020-08-02 RX ADMIN — APIXABAN 2.5 MG: 5 TABLET, FILM COATED ORAL at 09:34

## 2020-08-02 RX ADMIN — MULTIPLE VITAMINS W/ MINERALS TAB 1 TABLET: TAB at 09:34

## 2020-08-02 RX ADMIN — SODIUM CHLORIDE: 9 INJECTION, SOLUTION INTRAVENOUS at 22:17

## 2020-08-02 ASSESSMENT — PAIN SCALES - PAIN ASSESSMENT IN ADVANCED DEMENTIA (PAINAD)
BREATHING: 1
BREATHING: 0
TOTALSCORE: 5
TOTALSCORE: 5
BREATHING: 0
FACIALEXPRESSION: 1
FACIALEXPRESSION: 1
NEGVOCALIZATION: 1
BREATHING: 1
NEGVOCALIZATION: 1
TOTALSCORE: 5
CONSOLABILITY: 1
FACIALEXPRESSION: 1
CONSOLABILITY: 1
CONSOLABILITY: 0
NEGVOCALIZATION: 1
BREATHING: 1
TOTALSCORE: 0
FACIALEXPRESSION: 1
TOTALSCORE: 5
BODYLANGUAGE: 0
CONSOLABILITY: 1
BODYLANGUAGE: 1
BODYLANGUAGE: 1
BREATHING: 1
NEGVOCALIZATION: 1
BREATHING: 1
BODYLANGUAGE: 1
CONSOLABILITY: 0
BODYLANGUAGE: 2
NEGVOCALIZATION: 1
BODYLANGUAGE: 1
FACIALEXPRESSION: 1
TOTALSCORE: 3
NEGVOCALIZATION: 1
CONSOLABILITY: 1
BREATHING: 1
FACIALEXPRESSION: 1
BODYLANGUAGE: 1
TOTALSCORE: 5
TOTALSCORE: 5
CONSOLABILITY: 1
BODYLANGUAGE: 1
NEGVOCALIZATION: 1
BODYLANGUAGE: 1
CONSOLABILITY: 1
FACIALEXPRESSION: 2
NEGVOCALIZATION: 0
TOTALSCORE: 5
FACIALEXPRESSION: 1
NEGVOCALIZATION: 1
CONSOLABILITY: 1
BREATHING: 0
TOTALSCORE: 5
BODYLANGUAGE: 1
BODYLANGUAGE: 1
NEGVOCALIZATION: 1
NEGVOCALIZATION: 1
FACIALEXPRESSION: 1
FACIALEXPRESSION: 0
BREATHING: 1
CONSOLABILITY: 1
BREATHING: 1
CONSOLABILITY: 2
TOTALSCORE: 7
FACIALEXPRESSION: 1

## 2020-08-02 ASSESSMENT — PAIN SCALES - GENERAL
PAINLEVEL_OUTOF10: 8
PAINLEVEL_OUTOF10: 0
PAINLEVEL_OUTOF10: 3
PAINLEVEL_OUTOF10: 0
PAINLEVEL_OUTOF10: 4
PAINLEVEL_OUTOF10: 2

## 2020-08-02 ASSESSMENT — PAIN DESCRIPTION - PAIN TYPE: TYPE: ACUTE PAIN

## 2020-08-02 ASSESSMENT — PAIN DESCRIPTION - LOCATION: LOCATION: HIP

## 2020-08-02 NOTE — PROGRESS NOTES
Speech Language Pathology  Attempted to see pt for clinical swallow evaluation. Pt currently refusing PO trials. Will attempt to complete evaluation later this date as schedule allows or 8/3/2020.     Dameon Maldonado, [facility], Karla Strauss  Speech-Language Pathologist [id number]

## 2020-08-02 NOTE — ED NOTES
Pt comes in today by [facility] from [facility] due to a fall that happened on Thursday. Xray imaging was obtained at facility but no CD was brought with patient. Xrays states that pt has a right distal femur fracture. Refer to paper on chart. Pt has HX of dementia and is unable to provide HX. Pt given MEDS and labs obtained per MD orders. Pt a little agitated at this time. Pt hooked up to monitor. No signs of resp distress. Will continue to monitor.       Юлия Starr RN  08/01/20 0034

## 2020-08-02 NOTE — CONSULTS
Orthopaedic Surgery Consult Note      Reason for consult:  Right periprosthetic distal femur fracture    Requesting physician: UMM Childers    CHIEF COMPLAINT: Right knee pain /  fracture. HISTORY:  Ms. Ana Maria Laguna is a 80 y.o. female, who presents from [facility] for evaluation of a right knee injury. The injury reportedly  occurred when had a fall. She was first seen and evaluated in the ER and found to have distal femur periprosthetic fracture. She is unable to provide any significant history secondary to her dementia. She reportedly is wheelchair dependent. No history was able to be obtained from patient. I was able to speak with the patient's son Lindsey Wooten ([phone]), who provided more history. She is in already in hospice at [facility]. He is not even sure how much she is getting up in to wheelchair.          Past Medical History:   Diagnosis Date    AD [facility]Alzheimer's disease) ([facility] [address]     Anemia     Arthritis     Bronchial asthma     Dementia (HCC)     GERD (gastroesophageal reflux disease)     Hyperlipidemia     Osteopenia     Overactive bladder        Past Surgical History:   Procedure Laterality Date    CARPAL TUNNEL RELEASE      Left    CATARACT REMOVAL      Bilateral    COLONOSCOPY  2002    JOINT REPLACEMENT      RETINAL DETACHMENT SURGERY      Left    TOTAL HIP ARTHROPLASTY  5/2008    Left    TOTAL KNEE ARTHROPLASTY      Left 9/2007, Right 5/2007    UPPER GASTROINTESTINAL ENDOSCOPY  1990       Current Facility-Administered Medications   Medication Dose Route Frequency Provider Last Rate Last Dose    LORazepam (ATIVAN) injection 1 mg  1 mg Intravenous Once Omar Laird PA-C        morphine (PF) injection 2 mg  2 mg Intravenous Q1H PRN Omar Laird PA-C        aspirin chewable tablet 324 mg  324 mg Oral Once Omar Laird PA-C         Current Outpatient Medications   Medication Sig Dispense Refill    Multiple Vitamins-Minerals (THERAPEUTIC MULTIVITAMIN-MINERALS) tablet Take 1 tablet by mouth daily      apixaban (ELIQUIS) 2.5 MG TABS tablet Take by mouth 2 times daily      acetaminophen (TYLENOL) 325 MG tablet Take 650 mg by mouth every 6 hours as needed for Pain      senna-docusate (PERICOLACE) 8.6-50 MG per tablet Take 1 tablet by mouth daily      polyethylene glycol (MIRALAX) powder Take 17 g by mouth daily. 1 capful in 8 oz water daily as needed       donepezil (ARICEPT) 10 MG tablet Take 10 mg by mouth nightly      melatonin 3 MG TABS tablet Take 3 mg by mouth daily      vitamin D (CHOLECALCIFEROL) 1000 UNIT TABS tablet Take 1,000 Units by mouth daily      gabapentin (NEURONTIN) 100 MG tablet Take 1-4 tablets by mouth nightly for 360 days. 120 tablet 11    raloxifene (EVISTA) 60 MG tablet Take 1 tablet by mouth daily. 30 tablet 6    metoprolol (LOPRESSOR) 25 MG tablet Take 25 mg by mouth 2 times daily. 1/2 tab by mouth bid      albuterol (PROAIR HFA) 108 (90 BASE) MCG/ACT inhaler Inhale 2 puffs into the lungs every 4 hours as needed.  montelukast (SINGULAIR) 10 MG tablet Take 10 mg by mouth nightly. Allergies   Allergen Reactions    Alendronate Sodium     Prilosec [Omeprazole]        Social History     Socioeconomic History    Marital status:       Spouse name: Not on file    Number of children: Not on file    Years of education: Not on file    Highest education level: Not on file   Occupational History    Not on file   Social Needs    Financial resource strain: Not on file    Food insecurity     Worry: Not on file     Inability: Not on file    Transportation needs     Medical: Not on file     Non-medical: Not on file   Tobacco Use    Smoking status: Never Smoker    Smokeless tobacco: Never Used   Substance and Sexual Activity    Alcohol use: Yes     Comment: occasionally    Drug use: No    Sexual activity: Not on file   Lifestyle    Physical activity     Days per week: Not on file     Minutes per session: Not on file    Stress: Not on file   Relationships    Social connections     Talks on phone: Not on file     Gets together: Not on file     Attends [employer] service: Not on file     Active member of club or organization: Not on file     Attends meetings of clubs or organizations: Not on file     Relationship status: Not on file    Intimate partner violence     Fear of current or ex partner: Not on file     Emotionally abused: Not on file     Physically abused: Not on file     Forced sexual activity: Not on file   Other Topics Concern    Not on file   Social History Narrative    Not on file       Family History   Problem Relation Age of Onset    Bleeding Prob Mother         cerebral hemorrhage    Mental Illness Father        Review of Systems:   Unable to perform secondary to dementia. PHYSICAL EXAM:  Ms. Valeria Barber is a 80 y.o. female who presents today in no acute distress, arousable. BP (!) 94/54   Pulse 106   Temp 97.8 °F (36.6 °C) (Temporal)   Resp 19   Ht 5' 6\" (1.676 m)   Wt 111 lb 1.8 oz (50.4 kg)   SpO2 100%   BMI 17.93 kg/m²      Patient is lying in bed in lateral decubitus, almost fetal position, with her right side down. Her knees are positioned in almost 120 degrees of flexion. On evaluation of her right lower extremity, there is no obvious deformity. There is swelling. She is not extremely tender to palpation over the right knee, and otherwise nontender over the remainder of the extremity. I was unable to assess ROM of right knee since she was laying on it, and also secondary to acute fracture. Left knee was also held in 120 degrees of flexion. I could passively extend it to about 90 degrees of flexion. The skin overlying the right knee is grossly intact without evidence of lesion, laceration or abrasion. Distal pulses are 2+ and symmetric bilaterally.   Sensation is grossly intact to light touch and symmetric bilaterally. She wiggles toes bilaterally. Secondary skeletal survey negative. No tenderness to palpation in bilateral arms, left leg. There is no pain with passive motion of neck, shoulders, elbows, wrists, left knee, ankles. Right knee  Xrays: reviewed. Comminuted periprosthetic distal femur fracture extending into the TKA femoral prosthesis. Diffuse osteopenia      CBC:   Lab Results   Component Value Date    WBC 10.7 08/01/2020    RBC 3.47 08/01/2020    HGB 8.3 08/01/2020    HCT 26.9 08/01/2020    MCV 77.5 08/01/2020    MCH 23.8 08/01/2020    MCHC 30.7 08/01/2020    RDW 18.6 08/01/2020     08/01/2020    MPV 7.4 08/01/2020     PT/INR:    Lab Results   Component Value Date    PROTIME 17.8 08/01/2020    INR 1.53 08/01/2020     Chem Basic:   Lab Results   Component Value Date     08/01/2020    K 4.2 08/01/2020     08/01/2020    CO2 26 08/01/2020    GLUCOSE 132 08/01/2020    BUN 25 08/01/2020    CREATININE 0.7 08/01/2020          IMPRESSION:    Right comminuted periprosthetic distal femur fracture   Dementia  HTN  GERD  DNR-CC status      PLAN:  I discussed with the patient's son Salma Porras that she has a very complex fracture and problem. She has osteopenic or osteoporotic (no DEXA scan noted) bone. The fracture is comminuted and appears to extend into/behind the flanges of the femoral component of her TKA, which means that the femoral component may be unstable. She appears to have flexion contractures of her bilateral knees. We discussed multiple treatment options:  1. Distal femoral replacement. I would say this is not an option since she is nonabmulatory at baseline and she has significant flexion contractures, which would make this difficult and an extensive surgery for a patient who is already in hospice. 2. ORIF. I do not think this is an option given the apparent extension into the prosthesis and comminution and osteopenia. 3. Amputation.  This would eliminate the fracture and be compatible with her baseline function and positioning. However, this would be major surgery. 4. Nonoperative treatment. The goal would be to try to get some scar tissue and callus around the fracture and then that would likely result in pain relief and still be compatible with her baseline function. She is already in hospice and we also discussed palliative care, and pain control with pain medications. We did discuss that the hardest thing about this may be moving her for hygiene. We discussed attempted immobilization. I don't think a knee immobilizer would work because of her knee flexion contractures. We could try to place an splint anteriorly or try a T-scope brace, but that would concern me because seeing how she lays, they would likely rub on left leg and she could be at risk for ulcers. Her son elected to try nonoperative treatment with hospice/palliative care given her baseline condition, which I think is completely reasonable. We discussed that if they are unable to control her pain, then amputation is always available as salvage procedure. Pain control. Hospice or palliative care consult. Son states she is already in hospice at [facility]. Thank you for the opportunity to participate in the care of this patient. Please feel free to contact me with further questions. Over 50 minutes was spent in the care of this patient, the majority of which was in medical decision making with the patient's son. Jacy Farias.  Agustin Ureña MD  Orthopaedic Surgery and Sports Medicine  Electronically signed by Lul Gibson MD on 8/2/2020 at 8:17 AM

## 2020-08-02 NOTE — PROGRESS NOTES
Pt admission assessment completed. pt is alert and disoriented,and do not respond nor follow command. .Pt respiration are regular and unlabored and on 2L of oxygen. Breath sounds diminished. Fluids infusing in right wrist PIV. Scheduled medications given as ordered. Patient encouraged to use call light with any needs. call light in reach, bed alarm on. All safety checks in place and will continue to monitor pt.

## 2020-08-02 NOTE — PROGRESS NOTES
4 Eyes Skin Assessment     The patient is being assess for  Admission    I agree that 2 RN's have performed a thorough Head to Toe Skin Assessment on the patient. ALL assessment sites listed below have been assessed. Areas assessed by both nurses: yes  [x]   Head, Face, and Ears   [x]   Shoulders, Back, and Chest  [x]   Arms, Elbows, and Hands   [x]   Coccyx, Sacrum, and IschIum  [x]   Legs, Feet, and Heels        Does the Patient have Skin Breakdown?   Yes.brusies on both arms and legs         Isidro Prevention initiated:  Yes   Wound Care Orders initiated:  No      [facility] nurse consulted for Pressure Injury (Stage 3,4, Unstageable, DTI, NWPT, and Complex wounds), New and Established Ostomies:  No      Nurse 1 eSignature: Electronically signed by Vernon Fried RN on 8/2/20 at 5:07 AM EDT    **SHARE this note so that the co-signing nurse is able to place an eSignature**    Nurse 2 eSignature: Electronically signed by El Casarez RN on 8/3/20 at 5:57 AM EDT

## 2020-08-02 NOTE — ED NOTES
Report given to Traci Randhawa RN. All questions answered. Pt discharged to floor with all belongings.       Fe Bianchi RN  08/01/20 0042

## 2020-08-02 NOTE — PLAN OF CARE
Problem: Falls - Risk of:  Goal: Will remain free from falls  Description: Will remain free from falls  8/2/2020 1353 by Jania Kruse RN  Outcome: Ongoing  8/2/2020 1044 by Jania Kruse RN  Outcome: Ongoing  8/2/2020 0210 by Byron Molina RN  Outcome: Ongoing  Goal: Absence of physical injury  Description: Absence of physical injury  8/2/2020 0210 by Byron Molina RN  Outcome: Ongoing     Problem: Skin Integrity:  Goal: Will show no infection signs and symptoms  Description: Will show no infection signs and symptoms  8/2/2020 1353 by Jania Kruse RN  Outcome: Ongoing  8/2/2020 1044 by Jania Kruse RN  Outcome: Ongoing  8/2/2020 0210 by Byron Molina RN  Outcome: Ongoing  Goal: Absence of new skin breakdown  Description: Absence of new skin breakdown  8/2/2020 1353 by Jania Kruse RN  Outcome: Ongoing  8/2/2020 0210 by Byron Molina RN  Outcome: Ongoing     Problem: Pain:  Goal: Pain level will decrease  Description: Pain level will decrease  8/2/2020 1353 by Jania Kruse RN  Outcome: Ongoing  8/2/2020 1044 by Jania Kruse RN  Outcome: Ongoing  Goal: Control of acute pain  Description: Control of acute pain  Outcome: Ongoing  Goal: Control of chronic pain  Description: Control of chronic pain  8/2/2020 1353 by Jania Kruse RN  Outcome: Ongoing  8/2/2020 1044 by Jania Kruse RN  Outcome: Ongoing

## 2020-08-02 NOTE — PLAN OF CARE
Problem: Falls - Risk of:  Goal: Will remain free from falls  Description: Will remain free from falls  8/2/2020 1044 by Otilia Leyva RN  Outcome: Ongoing  8/2/2020 0210 by Johan Schultz RN  Outcome: Ongoing  Goal: Absence of physical injury  Description: Absence of physical injury  8/2/2020 0210 by Johan Schultz RN  Outcome: Ongoing     Problem: Skin Integrity:  Goal: Will show no infection signs and symptoms  Description: Will show no infection signs and symptoms  8/2/2020 1044 by Otilia Leyva RN  Outcome: Ongoing  8/2/2020 0210 by Johan Schultz RN  Outcome: Ongoing  Goal: Absence of new skin breakdown  Description: Absence of new skin breakdown  8/2/2020 0210 by Johan Schultz RN  Outcome: Ongoing     Problem: Pain:  Goal: Pain level will decrease  Description: Pain level will decrease  Outcome: Ongoing  Goal: Control of chronic pain  Description: Control of chronic pain  Outcome: Ongoing

## 2020-08-02 NOTE — PROGRESS NOTES
Morning assessment completed,no significant change observed, patient denies needs at this time, call light in reach, will continue to monitor.

## 2020-08-03 VITALS
RESPIRATION RATE: 16 BRPM | WEIGHT: 110.67 LBS | HEART RATE: 89 BPM | OXYGEN SATURATION: 95 % | TEMPERATURE: 97.4 F | DIASTOLIC BLOOD PRESSURE: 72 MMHG | HEIGHT: 66 IN | SYSTOLIC BLOOD PRESSURE: 141 MMHG | BODY MASS INDEX: 17.79 KG/M2

## 2020-08-03 LAB — SARS-COV-2, PCR: NOT DETECTED

## 2020-08-03 PROCEDURE — 92610 EVALUATE SWALLOWING FUNCTION: CPT

## 2020-08-03 PROCEDURE — 6360000002 HC RX W HCPCS: Performed by: INTERNAL MEDICINE

## 2020-08-03 PROCEDURE — 6370000000 HC RX 637 (ALT 250 FOR IP): Performed by: INTERNAL MEDICINE

## 2020-08-03 PROCEDURE — 92526 ORAL FUNCTION THERAPY: CPT

## 2020-08-03 RX ORDER — OXYCODONE HCL 20 MG/ML
10 CONCENTRATE, ORAL ORAL EVERY 4 HOURS PRN
Qty: 30 ML | Refills: 0 | Status: SHIPPED | OUTPATIENT
Start: 2020-08-03 | End: 2020-08-06

## 2020-08-03 RX ORDER — LORAZEPAM 2 MG/ML
1 CONCENTRATE ORAL EVERY 8 HOURS PRN
Qty: 30 ML | Refills: 0 | Status: SHIPPED | OUTPATIENT
Start: 2020-08-03 | End: 2020-08-17

## 2020-08-03 RX ADMIN — APIXABAN 2.5 MG: 5 TABLET, FILM COATED ORAL at 08:48

## 2020-08-03 RX ADMIN — HYDROMORPHONE HYDROCHLORIDE 0.5 MG: 1 INJECTION, SOLUTION INTRAMUSCULAR; INTRAVENOUS; SUBCUTANEOUS at 08:49

## 2020-08-03 RX ADMIN — POLYETHYLENE GLYCOL 3350 17 G: 17 POWDER, FOR SOLUTION ORAL at 08:48

## 2020-08-03 RX ADMIN — STANDARDIZED SENNA CONCENTRATE AND DOCUSATE SODIUM 1 TABLET: 8.6; 5 TABLET ORAL at 12:15

## 2020-08-03 RX ADMIN — MULTIPLE VITAMINS W/ MINERALS TAB 1 TABLET: TAB at 08:48

## 2020-08-03 ASSESSMENT — PAIN SCALES - PAIN ASSESSMENT IN ADVANCED DEMENTIA (PAINAD)
TOTALSCORE: 5
FACIALEXPRESSION: 1
NEGVOCALIZATION: 0
CONSOLABILITY: 1
BODYLANGUAGE: 0
BREATHING: 1
NEGVOCALIZATION: 1
BODYLANGUAGE: 1
FACIALEXPRESSION: 1
BODYLANGUAGE: 1
BODYLANGUAGE: 2
NEGVOCALIZATION: 1
TOTALSCORE: 7
BODYLANGUAGE: 1
TOTALSCORE: 5
BREATHING: 1
NEGVOCALIZATION: 1
CONSOLABILITY: 0
NEGVOCALIZATION: 1
FACIALEXPRESSION: 2
BODYLANGUAGE: 1
TOTALSCORE: 5
BREATHING: 1
CONSOLABILITY: 1
FACIALEXPRESSION: 0
CONSOLABILITY: 1
TOTALSCORE: 5
FACIALEXPRESSION: 1
CONSOLABILITY: 1
CONSOLABILITY: 1
NEGVOCALIZATION: 1
CONSOLABILITY: 1
BODYLANGUAGE: 1
TOTALSCORE: 5
BREATHING: 1
NEGVOCALIZATION: 1
BREATHING: 1
FACIALEXPRESSION: 1
BREATHING: 1
BREATHING: 0
TOTALSCORE: 0
FACIALEXPRESSION: 1

## 2020-08-03 ASSESSMENT — PAIN SCALES - GENERAL
PAINLEVEL_OUTOF10: 0
PAINLEVEL_OUTOF10: 7
PAINLEVEL_OUTOF10: 0
PAINLEVEL_OUTOF10: 7
PAINLEVEL_OUTOF10: 0

## 2020-08-03 NOTE — PROGRESS NOTES
Spoke to patients son, Gelacio Hernandez updated on COVID negative result and plan for discharge back to [facility] today or tomorrow depending on if they require two negative results.

## 2020-08-03 NOTE — PLAN OF CARE
Problem: Falls - Risk of:  Goal: Will remain free from falls  Description: Will remain free from falls  8/3/2020 0156 by Deidre Tran RN  Outcome: Ongoing  8/2/2020 1353 by Lonna Osgood, RN  Outcome: Ongoing  Goal: Absence of physical injury  Description: Absence of physical injury  Outcome: Ongoing     Problem: Skin Integrity:  Goal: Will show no infection signs and symptoms  Description: Will show no infection signs and symptoms  8/3/2020 0156 by Deidre Tran RN  Outcome: Ongoing  8/2/2020 1353 by Lonna Osgood, RN  Outcome: Ongoing  Goal: Absence of new skin breakdown  Description: Absence of new skin breakdown  8/3/2020 0156 by Deidre Tran RN  Outcome: Ongoing  8/2/2020 1353 by Lonna Osgood, RN  Outcome: Ongoing     Problem: Pain:  Goal: Pain level will decrease  Description: Pain level will decrease  8/3/2020 0156 by Deidre Tran RN  Outcome: Ongoing  8/2/2020 1353 by Lonna Osgood, RN  Outcome: Ongoing  Goal: Control of acute pain  Description: Control of acute pain  8/2/2020 1353 by Lonna Osgood, RN  Outcome: Ongoing  Goal: Control of chronic pain  Description: Control of chronic pain  8/2/2020 1353 by Lonna Osgood, RN  Outcome: Ongoing

## 2020-08-03 NOTE — PROGRESS NOTES
Pt shift assessment completed. Pt is alert and disoriented . Pt respiration are regular and unlabored and on 2L of oxygen. Breath sounds diminished. Fluids infusing in PIV. Scheduled medications given as ordered. Patient encouraged to use call light with any needs, call light in reach, bed alarm on. All safety checks in place and will continue to monitor pt.

## 2020-08-03 NOTE — PROGRESS NOTES
Reassessment completed, patient denies needs at this time, call light in reach, will continue to monitor.

## 2020-08-03 NOTE — ACP (ADVANCE CARE PLANNING)
Advance Care Planning     Advance Care Planning Activator (Inpatient)  Conversation Note      Date of ACP Conversation: 8/1/2020    Conversation Conducted with: patient    ACP Activator: ADRIANNE SPANN    *When Decision Maker makes decisions on behalf of the incapacitated patient: Decision Maker is asked to consider and make decisions based on patient values, known preferences, or best interests. Health Care Decision Maker:   patient    Current Designated Health Care Decision Maker:   Primary Decision Maker: Yasminekeila Escobar - [phone]    Secondary Decision Maker: Loreejensen Riley - [phone]   Care Preferences    Ventilation: \"If you were in your present state of health and suddenly became very ill and were unable to breathe on your own, what would your preference be about the use of a ventilator (breathing machine) if it were available to you? \"      Would the patient desire the use of ventilator (breathing machine)?: yes    \"If your health worsens and it becomes clear that your chance of recovery is unlikely, what would your preference be about the use of a ventilator (breathing machine) if it were available to you? \"     Would the patient desire the use of ventilator (breathing machine)? :no      Resuscitation  \"CPR works best to restart the heart when there is a sudden event, like a heart attack, in someone who is otherwise healthy. Unfortunately, CPR does not typically restart the heart for people who have serious health conditions or who are very sick. \"    \"In the event your heart stopped as a result of an underlying serious health condition, would you want attempts to be made to restart your heart (answer \"yes\" for attempt to resuscitate) or would you prefer a natural death (answer \"no\" for do not attempt to resuscitate)? \" yes      NOTE: If the patient has a valid advance directive AND now provides care preference(s) that are inconsistent with that prior directive, advise the patient to consider either: creating a new advance directive that complies with state-specific requirements; or, if that is not possible, orally revoking that prior directive in accordance with state-specific requirements, which must be documented in the EHR. [] Yes   [] No   Educated Patient / Chrissie Orts regarding differences between Advance Directives and portable DNR orders. Length of ACP Conversation in minutes:      Conversation Outcomes:  [x] ACP discussion completed  [] Existing advance directive reviewed with patient; no changes to patient's previously recorded wishes  [] New Advance Directive completed  [] Portable Do Not Rescitate prepared for Provider review and signature  [] POLST/POST/MOLST/MOST prepared for Provider review and signature      Follow-up plan:    [] Schedule follow-up conversation to continue planning  [] Referred individual to Provider for additional questions/concerns   [] Advised patient/agent/surrogate to review completed ACP document and update if needed with changes in condition, patient preferences or care setting    [x] This note routed to one or more involved healthcare providers  JONATHAN Looney       Case management will continue to follow progress and update discharge plan as needed.     Romana Riis, [place], .[phone]

## 2020-08-03 NOTE — DISCHARGE INSTR - COC
Continuity of Care Form    Patient Name: Danielle Palmer   :  1925  MRN:  [id number]    Admit date:  2020  Discharge date:  2020    Code Status Order: [facility]  Advance Directives:   [facility] Documentation     Date/Time Healthcare Directive Type of Healthcare Directive Copy in [address] Agent's Name Healthcare Agent's Phone Number    [phone]  Unknown, patient unable to respond due to medical condition -- -- -- -- --          Admitting Physician:  Lieutenant Constantin MD  PCP: Wadie Boeck, MD    Discharging Nurse: Rosalva Saab Unit/Room#: Y1U-5136/4483-39  Discharging Unit Phone Number: [phone]    Emergency Contact:   Extended Emergency Contact Information  Primary Emergency Contact: [address] [place] [address] Phone: [phone]  Relation: Child  Secondary Emergency Contact: [address] Phone: [phone]  Mobile Phone: [phone]  Relation: Child    Past Surgical History:  Past Surgical History:   Procedure Laterality Date    CARPAL TUNNEL RELEASE      Left    CATARACT REMOVAL      Bilateral    COLONOSCOPY      JOINT REPLACEMENT      RETINAL DETACHMENT SURGERY      Left    TOTAL HIP ARTHROPLASTY  2008    Left    TOTAL KNEE ARTHROPLASTY      Left 2007, Right 2007    UPPER GASTROINTESTINAL ENDOSCOPY         Immunization History: There is no immunization history on file for this patient.     Active Problems:  Patient Active Problem List   Diagnosis Code    Constipation, NOS K59.00    Back pain M54.9    Atrial fibrillation (HCC) I48.91    Osteoporosis M81.0    Osteoarthritis M19.90    Osteopenia M85.80    GERD (gastroesophageal reflux disease) K21.9    Asthma J45.909    Closed fracture of distal end of right femur with routine healing S72.401D    Severe protein-calorie malnutrition ([facility] [address] E43    Vascular dementia without behavioral disturbance (HCC) F01.50    Normal pressure hydrocephalus (HCC) G91.2    Dehydration E86.0    Anemia, chronic disease D63.8       Isolation/Infection:   Isolation          No Isolation        Patient Infection Status     Infection Onset Added Last Indicated Last Indicated By Review Planned Expiration Resolved Resolved By    None active    Resolved    COVID-19 Rule Out 08/01/20 08/01/20 08/01/20 COVID-19 (Ordered)   08/03/20 Rule-Out Test Resulted          Nurse Assessment:  Last Vital Signs: BP (!) 144/62   Pulse 96   Temp 96.7 °F (35.9 °C) (Temporal)   Resp 14   Ht 5' 6\" (1.676 m)   Wt 110 lb 10.7 oz (50.2 kg)   SpO2 100%   BMI 17.86 kg/m²     Last documented pain score (0-10 scale): Pain Level: 7  Last Weight:   Wt Readings from Last 1 Encounters:   08/03/20 110 lb 10.7 oz (50.2 kg)     Mental Status:  disoriented    IV Access:  - None    Nursing Mobility/ADLs:  Walking   Dependent  Transfer  Dependent  Bathing  Dependent  Dressing  Dependent  Toileting  Dependent  Feeding  Assisted  Med Admin  Dependent  Med Delivery   crushed    Wound Care Documentation and Therapy:        Elimination:  Continence:   · Bowel: No  · Bladder: No  Urinary Catheter: None   Colostomy/Ileostomy/Ileal Conduit: No       Date of Last BM: 7/31    Intake/Output Summary (Last 24 hours) at 8/3/2020 1033  Last data filed at 8/3/2020 0902  Gross per 24 hour   Intake 170 ml   Output 900 ml   Net -730 ml     I/O last 3 completed shifts: In: 100 [P.O.:100]  Out: 900 [Urine:900]    Safety Concerns: At Risk for Falls    Impairments/Disabilities:      None    Nutrition Therapy:  Current Nutrition Therapy:   - Oral Diet:  General    Routes of Feeding: Oral  Liquids: Thin Liquids  Daily Fluid Restriction: no  Last Modified Barium Swallow with Video (Video Swallowing Test): not done    Treatments at the Time of Hospital Discharge:   Respiratory Treatments: none  Oxygen Therapy:  is on oxygen at 3 L/min per nasal cannula.   Ventilator:    - No ventilator support    Rehab Therapies: none  Weight Bearing Status/Restrictions: No weight bearing restirctions  Other Medical Equipment (for information only, NOT a DME order):  hospital bed  Other Treatments: Hospice    Patient's personal belongings (please select all that are sent with patient):  None    RN SIGNATURE:  Electronically signed by Rosi Verma RN on 8/3/20 at 12:47 PM EDT    CASE MANAGEMENT/SOCIAL WORK SECTION    Inpatient Status Date: 01 AUG 20    Readmission Risk Assessment Score:  Readmission Risk              Risk of Unplanned Readmission:        17           Discharging to Facility/ Agency   · Name:   [facility]  · Address:  [address] [place] [address]  · Phone:  [phone]   · Fax:  [phone]       / signature: LENORA Blackwell, .[phone]    PHYSICIAN SECTION    Prognosis: Poor    Condition at Discharge: Stable    Rehab Potential (if transferring to Rehab): Poor    Recommended Labs or Other Treatments After Discharge: Palliative care     Physician Certification: I certify the above information and transfer of Jordan Cloud  is necessary for the continuing treatment of the diagnosis listed and that she requires Palliative Care for less 30 days.      Update Admission H&P: No change in H&P    PHYSICIAN SIGNATURE:  Electronically signed by Sruthi Chatman MD on 8/3/20 at 10:34 AM EDT

## 2020-08-03 NOTE — H&P
[place] [address]                     [facility], [address]                              HISTORY AND PHYSICAL    PATIENT NAME: Angelica Turner                      :        1925  MED REC NO:   [id number]                          ROOM:       4768  ACCOUNT NO:   [de-identified]                           ADMIT DATE: 2020  PROVIDER:     Pascual Williamson MD    HISTORY OF PRESENT ILLNESS:  The patient is a 77-year-old  institutionalized lady from nursing home at [facility], who came to the  emergency room following a fall. She is usually wheelchair-bound and  she apparently tried to get up and fell on her own and she has developed  injury with fracture. She does have a known history of total knee  prosthesis and this fracture happened around the prosthesis, also known  as periprosthetic fracture of the right femur. The patient is in  moderate pain. The patient is totally demented, unable to verbalize any  complaint and appears to be in moderate distress. PAST MEDICAL HISTORY:  Pertinent for advanced dementia. She also has  prior history of overactive bladder, osteopenia, hyperlipidemia,  gastroesophageal reflux disease, bronchial asthma, osteoarthritis,  anemia of chronic disease and severe Alzheimer's disease. PAST SURGICAL HISTORY:  Pertinent for upper GI endoscopy, colonoscopy,  total hip arthroplasty, knee arthroplasty, retinal detachment and  cataract removal.    MEDICATIONS:  Lorazepam, morphine, aspirin, ondansetron and  acetaminophen. ALLERGIES:  ALENDRONATE and PRILOSEC. SOCIAL HISTORY:  She is a . She had three children, now there are  two living. She was never a smoker. She would have an occasional  alcoholic beverage. 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 She has no history of substance abuse.   She is institutionalized because  of her advanced dementia. FAMILY HISTORY:  Both her parents are  because of natural  causes. Her mother had cerebral hemorrhage. Her father had some kind  of mental illness. REVIEW OF SYSTEMS:  Unobtainable. The patient is almost aphasic, does  not verbalize anything meaningful and appears to be in moderate  distress. No recent angina pectoris. Overall failure to thrive, very  poor nutrition. Does have exertional shortness of breath. Does have  poor oral intake. No abdominal pain. No hematemesis. No melena. No  hematuria. Frequently incontinent to urine and feces. No convulsions. PHYSICAL EXAMINATION:  GENERAL APPEARANCE:  Lethargic, somnolent, totally incoherent  80year-old undernourished white woman. VITAL SIGNS:  Her BMI is only 17.9 to be precise. Her temperature is  100.2, blood pressure 115/54, respirations 22, heart rate 113. O2 sat  is 100% on 2 liters nasal cannula. HEENT:  Oral mucosa dry. SKIN:  Warm and dry. NECK:  Supple. Faint carotid bruit. No jugular venous distention. No  lymphadenopathy. LUNGS:  Bronchovesicular breathing pattern with few coarse crackles. Poor inspiration. HEART:  Regular rate and rhythm. S1, S2. No gallop rhythm. ABDOMEN:  Soft, nontender. Bowel sounds present. EXTREMITIES:  Shows right-sided side thigh has tenderness, swelling and  restricted range of motion in the right knee area. No distal  neurovascular deficit. LABORATORY DATA:  Lab evaluation shows sodium 144, potassium 4.2,  chloride 108, CO2 26, BUN 25, creatinine 0.7, lactic acid level 1.4. Blood sugar is 132, calcium is 9.3, total . Troponin 0.04. Albumin 3.5, globulin 3.0, alkaline phosphatase 82. White blood cell  count 10.7, hemoglobin/hematocrit 8.3 and 26.7, MCV 77.5, platelet count  249. PT/INR is 17.8 and 1.53. Urinalysis shows 4 WBCs. ABG shows pH  of 7.53, pCO2 24.9, pO2 189 and bicarbonate 21.2.     Head CT, moderate cerebral cortical atrophy, ventricle were

## 2020-08-03 NOTE — PROGRESS NOTES
[facility]                                            Advanced Care Planning Note. Purpose of Encounter: Advanced care planning in light of advanced age  And advanced dementia , hydrocephalus  And severe malnutrition  With total failure to thrive   Parties In Attendance: Patient,  Patient son and Dmitry Oh                                                                                       Subjective: Patient/family understand that this conversation is to address long term care goal  Objective: They do not want any CPR , Mechanical Vent , Defibrillation artificial Life support and sustaining  Measures   Goals of Care Determination: Patient/POA Jorge Crook    Code Status:  DNR CC  Time spent on Advanced care Plannin minutes   Advanced Care Planning Documents: Completed advanced directives on chart, Gurvinder Crook  is the POA.     Olivia Mujica MD  2020 9:53 PM

## 2020-08-03 NOTE — PROGRESS NOTES
Shift assessment completed, patient is disoriented x4, VSS, advanced dementia pain scale used, 7/10, PRN pain medication given per MAR. Fall precautions in place, hourly rounding, call light and belongings in reach, bed in lowest position, wheels locked in place, side rails up x 2, walkways free of clutter, bed alarm on.

## 2020-08-03 NOTE — PLAN OF CARE
Problem: Falls - Risk of:  Goal: Will remain free from falls  Description: Will remain free from falls  8/3/2020 1213 by Tk Parikh RN  Outcome: Completed  8/3/2020 0156 by Rayshawn Wilson RN  Outcome: Ongoing  Goal: Absence of physical injury  Description: Absence of physical injury  8/3/2020 1213 by Tk Parikh RN  Outcome: Completed  8/3/2020 0156 by Rayshawn Wilson RN  Outcome: Ongoing     Problem: Skin Integrity:  Goal: Will show no infection signs and symptoms  Description: Will show no infection signs and symptoms  8/3/2020 1213 by Tk Parikh RN  Outcome: Completed  8/3/2020 0156 by Rayshawn Wilson RN  Outcome: Ongoing  Goal: Absence of new skin breakdown  Description: Absence of new skin breakdown  8/3/2020 1213 by Tk Parikh RN  Outcome: Completed  8/3/2020 0156 by Rayshawn Wilson RN  Outcome: Ongoing     Problem: Pain:  Goal: Pain level will decrease  Description: Pain level will decrease  8/3/2020 1213 by Tk Parikh RN  Outcome: Completed  8/3/2020 0156 by Rayshawn Wilson RN  Outcome: Ongoing  Goal: Control of acute pain  Description: Control of acute pain  Outcome: Completed  Goal: Control of chronic pain  Description: Control of chronic pain  Outcome: Completed

## 2020-08-03 NOTE — PROGRESS NOTES
Speech Language Pathology  CLINICAL BEDSIDE SWALLOWING EVALUATION  Speech Therapy Department    Patient Name:  Edita Dinero  :  1925  Pain level:Pt denies pain at this time  Medical Diagnosis:   Closed fracture of bone [T14. 8XXA]  Closed fracture of bone [T14. Katien Gordo is a 80 y.o. female presents to the emergency department via [facility] emergency medical services from the [facility] side of [address] [place] [address] with a history of significant dementia. It is reported that the patient had an injury from a fall that occurred on Thursday. The exact events leading up to the above-mentioned are not immediately noted. It is reported that she did receive x-rays in the facility and was found to have a right distal periprosthetic femur fracture. It is reported that she has been sent to the emergency department for definitive care and treatment. It is noted the patient is not normally ambulatory. It appears that she is normally wheelchair dependent. Unfortunately because of advanced dementia the patient cannot assist me with much of any of her history and this is all that is available other than her documented medical reconciliation and history form. Speech Therapy/Clinical Swallow evaluation order received. Per nurse report, Pt is currently on a Pureed diet with nectar thick liquids but demonstrates reduced po intake. It is unclear from chart review if current diet is Pt's baseline based on assessment or refusal to eat. Pt is currently alert but presents with confabulation and jargon like speech. Treatment Diagnosis: Moderate Oropharyngeal Dysphagia    Impressions: Pt alert and appears agreeable to po trials. Pt unable to comprehend commands for completion of OME but no overt facial asymmetry noted. Pt accepting of LIMITED po trials via tsp and cup to assess for texture tolerance.  Following every presentation, Pt gestured that she was finished, but was accepting of further po

## 2020-08-05 LAB — CULTURE, BLOOD 2: NORMAL

## 2020-08-06 LAB — BLOOD CULTURE, ROUTINE: NORMAL

## 2020-08-07 LAB — BLOOD BANK REF CASE: NORMAL

## 2020-09-01 PROBLEM — E86.0 DEHYDRATION: Status: RESOLVED | Noted: 2020-08-02 | Resolved: 2020-09-01

## 2020-09-19 NOTE — DISCHARGE SUMMARY
[facility]                     [facility], [address]                               DISCHARGE SUMMARY    PATIENT NAME: Fahad Tamez                      :        1925  MED REC NO:   [id number]                          ROOM:       3198  ACCOUNT NO:   [de-identified]                           ADMIT DATE: 2020  PROVIDER:     Jennifer Gonzalez MD                  DISCHARGE DATE:  2020    FINAL DIAGNOSES:  1. Fracture of the femoral shaft. 2.  Dehydration. 3.  Hydrocephalus. 4. Low-grade UTI. 5.  Advanced dementia. 6.  Alzheimer's disease. 7.  Severe protein-calorie malnutrition. DISCHARGE MEDICATIONS:  1.  Vitamin D 1000 units daily. 2.  Oxycodone Intensol 0.5 mL every four hours as needed. 3.  Ativan Intensol 0.5 mL every eight hours as needed. 4.  Multivitamin once a day. 5.  Eliquis 2.5 mg twice a day. 6.  Tylenol 650 q.4 p.r.n. pain. 7.  Shaista-Colace one tablet daily. 8.  MiraLAX 17 gm daily. 9.  Aricept 10 mg once a day. 10.  Melatonin 3 mg nightly. 11.  Gabapentin 100 mg four tablets nightly. 12.  Evista 60 mg one tablet daily. 13.  Lopressor 25 mg p.o. b.i.d.  14.  ProAir HFA two puffs every four hours p.r.n.  15.  Singulair 10 mg by mouth nightly. HOSPITAL COURSE:  This 51-year-old white lady from institution at [facility], came to the emergency room following a fall. She is usually  wheelchair bound and she apparently tried to get up and fell on her own  and she injured her femur. She does have a known total knee prosthesis  and fracture happened right around the prosthesis, also probably has a  periprosthetic fracture. Temperature 100.2, blood pressure 115/54,  respirations 22, heart rate 113 and O2 sat 100% on 2 liters. Sodium  144, potassium 4.2, chloride 108, CO2 26, BUN 25, creatinine 0.7 and  lactic acid level 1.4. Albumin 3.5, globulin 3.0 and alkaline  phosphatase 82.   White blood cell count was 10.7,